# Patient Record
Sex: FEMALE | Race: WHITE | NOT HISPANIC OR LATINO | Employment: OTHER | URBAN - METROPOLITAN AREA
[De-identification: names, ages, dates, MRNs, and addresses within clinical notes are randomized per-mention and may not be internally consistent; named-entity substitution may affect disease eponyms.]

---

## 2017-01-15 ENCOUNTER — HOSPITAL ENCOUNTER (EMERGENCY)
Facility: HOSPITAL | Age: 67
End: 2017-01-16
Attending: EMERGENCY MEDICINE | Admitting: EMERGENCY MEDICINE

## 2017-01-15 VITALS — BODY MASS INDEX: 36.8 KG/M2 | WEIGHT: 200 LBS | OXYGEN SATURATION: 99 % | HEIGHT: 62 IN

## 2017-01-15 DIAGNOSIS — I50.22 CHRONIC SYSTOLIC CONGESTIVE HEART FAILURE (HCC): Chronic | ICD-10-CM

## 2017-01-15 DIAGNOSIS — I46.9 CARDIAC ARREST (HCC): Primary | ICD-10-CM

## 2017-01-15 DIAGNOSIS — I27.20 PULMONARY HYPERTENSION (HCC): ICD-10-CM

## 2017-01-15 DIAGNOSIS — I46.9 ASYSTOLE (HCC): ICD-10-CM

## 2017-01-15 PROCEDURE — 25010000002 EPINEPHRINE 0.1 MG/ML SOLUTION PREFILLED SYRINGE: Performed by: EMERGENCY MEDICINE

## 2017-01-15 PROCEDURE — 99284 EMERGENCY DEPT VISIT MOD MDM: CPT

## 2017-01-15 PROCEDURE — 25010000002 EPINEPHRINE 0.1 MG/ML SOLUTION PREFILLED SYRINGE

## 2017-01-15 PROCEDURE — 94799 UNLISTED PULMONARY SVC/PX: CPT

## 2017-01-15 PROCEDURE — 92950 HEART/LUNG RESUSCITATION CPR: CPT

## 2017-01-15 RX ADMIN — EPINEPHRINE 1 MG: 0.1 INJECTION, SOLUTION ENDOTRACHEAL; INTRACARDIAC; INTRAVENOUS at 20:57

## 2017-01-15 RX ADMIN — EPINEPHRINE 1 MG: 0.1 INJECTION, SOLUTION ENDOTRACHEAL; INTRACARDIAC; INTRAVENOUS at 21:01

## 2017-01-16 NOTE — CODE DOCUMENTATION
Pt to rm 20 per [facility] in full arrest.  Pt intubated per [facility] prior to arrival, compressions in progress per [place] device.    No pulses palpated, CPR resumed per DWAINE Jimenez.

## 2017-01-16 NOTE — ED PROVIDER NOTES
EMERGENCY DEPARTMENT ENCOUNTER    CHIEF COMPLAINT  Chief Complaint: Cardiac Arrest  History given by: EMS, Family  History limited by: Acuity of condition  Room Number: 20/20  PMD: Ahsan Watts MD      HPI:  Pt is a 66 y.o. female who presents with a cardiac arrest onset today. Pt was found down by her family. Per the pt's family, the pt had been complaining of indigestion today. Pt has a hx of COPD and CHF.    Location of Arrest - Home  Witnessed Arrest - No  Bystander CPR - Yes, family  Time of Collapse - Unknown  Time CPR Initiated - Unknown   First Medical Professional on Scene - [facility]   Time on Scene - 2003  AED Used - Yes, advised not to shock  Dispatch Time - 1957  EMS on Scene Time - 2009   EMS Agency - [facility]  Initial Cardiac Rhythm - Asystole  ROSC in Field - No  Time of Arrival to [facility] ED - 2053  STEMI - No  Treatment Prior to Arrival - 4-5 epi, 1 sodium bicarb      PAST MEDICAL HISTORY  Active Ambulatory Problems     Diagnosis Date Noted   • Acute respiratory failure 09/29/2016   • Systolic congestive heart failure 10/05/2016     Resolved Ambulatory Problems     Diagnosis Date Noted   • No Resolved Ambulatory Problems     Past Medical History   Diagnosis Date   • Arthritis    • Cardiomyopathy    • CHF (congestive heart failure)    • COPD (chronic obstructive pulmonary disease)    • GERD (gastroesophageal reflux disease)    • Hypertension    • Morbid obesity    • Pneumonia    • Pulmonary hypertension    • Strep throat        PAST SURGICAL HISTORY  Past Surgical History   Procedure Laterality Date   • Mastectomy         FAMILY HISTORY  History reviewed. No pertinent family history.    SOCIAL HISTORY  Social History     Social History   • Marital status:      Spouse name: N/A   • Number of children: N/A   • Years of education: N/A     Occupational History   • Not on file.     Social History Main Topics   • Smoking status: Former Smoker     Packs/day: 0.50      Years: 50.00     Types: Cigarettes     Quit date: 9/29/2006   • Smokeless tobacco: Not on file   • Alcohol use Yes      Comment: 4 drinks a year   • Drug use: No   • Sexual activity: No     Other Topics Concern   • Not on file     Social History Narrative   • No narrative on file       ALLERGIES  Latex and Neosporin [neomycin-bacitracin zn-polymyx]    REVIEW OF SYSTEMS  Review of Systems   Unable to perform ROS: Acuity of condition   Gastrointestinal:        Indigestion       PHYSICAL EXAM  ED Triage Vitals   Temp Pulse Resp BP SpO2   -- -- -- -- --             Temp src Heart Rate Source Patient Position BP Location FiO2 (%)   -- -- -- -- --              Physical Exam   Eyes: Right pupil is reactive. Left pupil is reactive.   Pupils are dilated    Cardiovascular:   CPR in progress, no spontaneous pulse return   Pulmonary/Chest:   7.5 ET tube in place per EMS, 22 at the lip, tube placement checked, breath sounds equal but decreased  No spontaneous respirations  Pt has bilateral mastectomies   Skin:   Diffuse modeling of the face, head, extremities, and trunk  Diffuse subcutaneous emphysema from the head down to knees       LAB RESULTS  Lab Results (last 24 hours)     ** No results found for the last 24 hours. **          RADIOLOGY  No orders to display        PROCEDURES  Procedures    Procedure- 2059  Bilateral needle thoracostomy performed. Performed due to massive subcutaneous emphysema to rule out tension pneumothorax.    There was no return of pulse after thoracostomy.       PROGRESS AND CONSULTS  ED Course     8:53 PM:  Pt arrival to ED. Code blue called. CPR initiated by ED staff.    8:56 PM:  Pulse check. Pt is asystole.    8:57 PM:  Dose of epi given.    8:59 PM:  Bilateral needle thoracostomy performed.    9:00 PM:  Pulse check. Pt is asystole.    9:01 PM:  Dose of epi given.    9:03 PM:  Pulse check. Pt is asystole. CPR and ALS drugs continued without success. TOD called. TOD called due to long down time of  approximately 1 hour, continued asystole, and no response to medications in the ED.    9:04 PM:  Informed pt's family of pt's expiration.     MEDICAL DECISION MAKING  Results were reviewed/discussed with the patient and they were also made aware of online access. Pt also made aware that some labs, such as cultures, will not be resulted during ER visit and follow up with PMD is necessary.     MDM       DIAGNOSIS  Final diagnoses:   Cardiac arrest   Asystole   Chronic systolic congestive heart failure   Pulmonary hypertension       DISPOSITION  - .    Latest Documented Vital Signs:  As of 10:26 PM  BP- (!) 0/0 HR- (!) 0 Temp-   O2 sat- 99%    --  Documentation assistance provided by kd Laureano for Bernardo Bernal MD.  Information recorded by the scribe was done at my direction and has been verified and validated by me.       Supa Laureano  01/15/17 [id number]       Bernardo Bernal MD  01/15/17 [id number]

## 2017-01-16 NOTE — ED NOTES
Spoke with Gricel at [facility] to inform that family does not want to speak with LEELA tonight but will talk with them tomorrow at 1030.  Phone number of Shari (daughter) given to [facility]EL[facility].  Pts family informed that [facility] will be calling them at 1030 tmw am.  Verbalized understanding.  Family unsure at this time what  home they will choose. Family has been given [facility] phone number in the event they wish to talk them sooner than assigned time     Philomena Silva RN  01/15/17 3510